# Patient Record
Sex: MALE | Race: WHITE | Employment: STUDENT | URBAN - METROPOLITAN AREA
[De-identification: names, ages, dates, MRNs, and addresses within clinical notes are randomized per-mention and may not be internally consistent; named-entity substitution may affect disease eponyms.]

---

## 2024-07-25 ENCOUNTER — HOSPITAL ENCOUNTER (EMERGENCY)
Facility: CLINIC | Age: 59
Discharge: HOME | End: 2024-07-25
Attending: EMERGENCY MEDICINE
Payer: COMMERCIAL

## 2024-07-25 VITALS
RESPIRATION RATE: 14 BRPM | HEART RATE: 65 BPM | OXYGEN SATURATION: 100 % | BODY MASS INDEX: 30.36 KG/M2 | WEIGHT: 205 LBS | SYSTOLIC BLOOD PRESSURE: 156 MMHG | TEMPERATURE: 97.6 F | HEIGHT: 69 IN | DIASTOLIC BLOOD PRESSURE: 91 MMHG

## 2024-07-25 DIAGNOSIS — M70.22 OLECRANON BURSITIS OF LEFT ELBOW: Primary | ICD-10-CM

## 2024-07-25 PROCEDURE — 99283 EMERGENCY DEPT VISIT LOW MDM: CPT

## 2024-07-25 RX ORDER — INDOMETHACIN 50 MG/1
50 CAPSULE ORAL 2 TIMES DAILY WITH MEALS
Qty: 14 CAPSULE | Refills: 0 | Status: SHIPPED | OUTPATIENT
Start: 2024-07-25 | End: 2024-08-01

## 2024-07-25 ASSESSMENT — ENCOUNTER SYMPTOMS
GASTROINTESTINAL NEGATIVE: 1
RESPIRATORY NEGATIVE: 1

## 2024-07-25 NOTE — ED PROVIDER NOTES
[facility]  [address] [id number]  Phone: [phone]        [facility] ED  EMERGENCY DEPARTMENT ENCOUNTER      Pt Name: Papito Pruett  MRN: [id number]  Birthdate 1965  Date of evaluation: 7/25/2024  Provider: Bailey Donis MD    CHIEF COMPLAINT       Chief Complaint   Patient presents with    Elbow Swelling     Left elbow swelling, pt denies trauma          HISTORY OF PRESENT ILLNESS   (Location/Symptom, Timing/Onset,Context/Setting, Quality, Duration, Modifying Factors, Severity)  Note limiting factors.   Papito Pruett is a 58 y.o. male who presents to the emergency department complaining of left elbow swelling that is there for about 2 to 3 days.  He has had no trauma or strain.  He does not have any work where he is on his elbow for excessive periods of time.  He denies any pain in the bicep forearm shoulder hand or wrist.  He has no fevers chills nausea vomiting diarrhea rash or joint issues elsewhere.  He states he has had gout in the past.  Chart review also revealed that he had olecranon bursitis in the past.  He has tried Aleve for the pain.     Patient is not diabetic does not smoke no hypertension MI or stroke no blood thinners.  He is not on any medications and has no allergies.  Patient denies any kidney disease or ulcer history.  he states he had a physical recently and his blood pressure was normal at that time    Nursing Notes were reviewed.    REVIEW OF SYSTEMS    (2-9systems for level 4, 10 or more for level 5)     Review of Systems   Constitutional: Negative.    HENT: Negative.     Respiratory: Negative.     Cardiovascular: Negative.    Gastrointestinal: Negative.    Genitourinary: Negative.    Musculoskeletal:         Pain to the  left elbow, no hand wrist forearm bicep or shoulder pain   Skin: Negative.    Neurological: Negative.    Psychiatric/Behavioral: Negative.         Except asnoted above the remainder of the review of systems was reviewed and

## 2024-07-25 NOTE — DISCHARGE INSTRUCTIONS
Apply ice to the elbow.  Apply elbow wrap.  Take Indocin for the next 7 days with food.  Return if increased swelling increased redness fevers swelling to the upper or lower arm or any other concerns.